# Patient Record
Sex: FEMALE | Race: WHITE | NOT HISPANIC OR LATINO | URBAN - METROPOLITAN AREA
[De-identification: names, ages, dates, MRNs, and addresses within clinical notes are randomized per-mention and may not be internally consistent; named-entity substitution may affect disease eponyms.]

---

## 2020-01-09 ENCOUNTER — OFFICE (OUTPATIENT)
Dept: URBAN - METROPOLITAN AREA CLINIC 11 | Facility: CLINIC | Age: 30
End: 2020-01-09

## 2020-01-09 VITALS
DIASTOLIC BLOOD PRESSURE: 68 MMHG | SYSTOLIC BLOOD PRESSURE: 147 MMHG | HEIGHT: 62 IN | HEART RATE: 94 BPM | WEIGHT: 122 LBS

## 2020-01-09 DIAGNOSIS — K59.00 CONSTIPATION, UNSPECIFIED: ICD-10-CM

## 2020-01-09 PROCEDURE — 99203 OFFICE O/P NEW LOW 30 MIN: CPT

## 2020-01-09 RX ORDER — HYDROCORTISONE 25 MG/G
CREAM TOPICAL
Qty: 1 | Refills: 0 | Status: ACTIVE
Start: 2020-01-09

## 2020-01-09 NOTE — SERVICEHPINOTES
Mrs. Mendez is a 28 y/o WF, history significant for chronic constipation and current pregnancy (9w6d) who presents with acute on chronic constipation. She has history of constipation dating back to middle schedule. At times she has gone 1-2 weeks without a bowel movement. Prior to pregnancy she exercised regularly and consumed a healthy high fiber diet/water and had a bowel movement 1-2 times per week. In pregnancy, her diet has changed and she is no longer eating adequate fiber. Her last BM was 1/4/2020 and described as [place] 1. She has tried miralax and celery juice with no improvement. She called her OB last night and was instructed to do a enema and milk of magnesia. She did the enema with no stool output. There was significant lower abdominal cramping. This morning there is LLQ cramping. She is passing flatus. Some bloating. She has tried to manually disimpact herself and feels like the stool too large to expel. There is some rectal discomfort from straining.Her current prenatals do not contain iron, and she was instructed at OB visit yesterday to switch to iron formulation. I discussed that constipation is common in pregnancy, as well as use with iron supplements, and given her history of chronic constipation, she will likely need to use miralax, or something similar, daily and to check with OB for preference.